# Patient Record
Sex: MALE | Race: BLACK OR AFRICAN AMERICAN | Employment: PART TIME | URBAN - METROPOLITAN AREA
[De-identification: names, ages, dates, MRNs, and addresses within clinical notes are randomized per-mention and may not be internally consistent; named-entity substitution may affect disease eponyms.]

---
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
## 2023-04-06 ENCOUNTER — OFFICE VISIT (OUTPATIENT)
Dept: FAMILY MEDICINE | Facility: CLINIC | Age: 23
End: 2023-04-06
Payer: COMMERCIAL

## 2023-04-06 VITALS
WEIGHT: 156 LBS | BODY MASS INDEX: 21.13 KG/M2 | HEIGHT: 72 IN | RESPIRATION RATE: 16 BRPM | HEART RATE: 105 BPM | TEMPERATURE: 98.3 F | DIASTOLIC BLOOD PRESSURE: 79 MMHG | OXYGEN SATURATION: 97 % | SYSTOLIC BLOOD PRESSURE: 124 MMHG

## 2023-04-06 DIAGNOSIS — H10.13 ALLERGIC CONJUNCTIVITIS, BILATERAL: Primary | ICD-10-CM

## 2023-04-06 PROCEDURE — 99213 OFFICE O/P EST LOW 20 MIN: CPT | Performed by: FAMILY MEDICINE

## 2023-04-06 RX ORDER — OLOPATADINE HYDROCHLORIDE 2 MG/ML
1-2 SOLUTION/ DROPS OPHTHALMIC DAILY
Qty: 2.5 ML | Refills: 1 | Status: SHIPPED | OUTPATIENT
Start: 2023-04-06 | End: 2024-05-31

## 2023-04-06 RX ORDER — LORATADINE 10 MG/1
10 TABLET ORAL DAILY
Qty: 60 TABLET | Refills: 1 | Status: SHIPPED | OUTPATIENT
Start: 2023-04-06 | End: 2024-05-31

## 2023-04-06 ASSESSMENT — VISUAL ACUITY: OU: 1

## 2023-04-06 NOTE — PROGRESS NOTES
Assessment & Plan     Allergic conjunctivitis, bilateral  Symptoms and exam consistent with mild allergic conjunctivitis bilaterally we will treat systemically as patient is also seems to have some mild allergic rhinitis.  And also local drops 1-2 twice a day.  Follow-up if not improved in a week.  - loratadine (CLARITIN) 10 MG tablet; Take 1 tablet (10 mg) by mouth daily  - olopatadine (PATADAY) 0.2 % ophthalmic solution; Place 0.05-0.1 mLs (1-2 drops) into both eyes daily                 Return if symptoms worsen or fail to improve, for CPE/Wellness Visit.    Virgil Coulter MD  [facility] LIANA Fields is a 22 year old, presenting for the following health issues:  Allergies (Pt states that he has itchy, swollen and watery eyes since the end of January)        4/6/2023    11:22 AM   Additional Questions   Roomed by ricardo   Accompanied by self     HPI   Eyes Swollen, itcy and watery no discharge no vision change  Runny nose           Review of Systems         Objective    /79 (BP Location: Left arm, Patient Position: Sitting, Cuff Size: Adult Regular)   Pulse 105   Temp 98.3  F (36.8  C) (Oral)   Resp 16   Ht 1.829 m (6')   Wt 70.8 kg (156 lb)   SpO2 97%   BMI 21.16 kg/m    Body mass index is 21.16 kg/m .  Physical Exam  Constitutional:       Appearance: He is well-developed. He is not ill-appearing or diaphoretic.   HENT:      Head: Normocephalic.      Right Ear: Tympanic membrane and external ear normal.      Left Ear: Tympanic membrane and external ear normal.      Nose: Mucosal edema and rhinorrhea present.      Right Sinus: No maxillary sinus tenderness or frontal sinus tenderness.      Left Sinus: No maxillary sinus tenderness or frontal sinus tenderness.   Eyes:      General: Lids are everted, no foreign bodies appreciated. Vision grossly intact. Allergic shiner present.         Right eye: No foreign body.         Left eye: No foreign body.       Conjunctiva/sclera:      Right eye: Right conjunctiva is injected. No exudate or hemorrhage.     Left eye: Left conjunctiva is injected. No exudate or hemorrhage.     Pupils: Pupils are equal, round, and reactive to light.   Pulmonary:      Effort: Pulmonary effort is normal. No respiratory distress.      Breath sounds: Normal breath sounds. No stridor.   Abdominal:      Palpations: Abdomen is soft.   Musculoskeletal:      Cervical back: Normal range of motion.

## 2023-04-06 NOTE — PATIENT INSTRUCTIONS
Patient Education   Here is the plan from today's visit    1. Allergic conjunctivitis, bilateral    - loratadine (CLARITIN) 10 MG tablet; Take 1 tablet (10 mg) by mouth daily  Dispense: 60 tablet; Refill: 1  - olopatadine (PATADAY) 0.2 % ophthalmic solution; Place 0.05-0.1 mLs (1-2 drops) into both eyes daily  Dispense: 2.5 mL; Refill: 1      Please call or return to clinic if your symptoms don't go away.    Follow up plan  Return if symptoms worsen or fail to improve, for CPE/Wellness Visit.    Thank you for coming to [facility] today.  Lab Testing:  **If you had lab testing today and your results are reassuring or normal they will be mailed to you or sent through [employer] within 7 days.   **If the lab tests need quick action we will call you with the results.  **If you are having labs done on a different day, please call [phone] to schedule at [facility] or [phone] for other [facility] Outpatient Lab locations. Labs do not offer walk-in appointments.  The phone number we will call with results is # [phone] (home) . If this is not the best number please call our clinic and change the number.  Medication Refills:  If you need any refills please call your pharmacy and they will contact us.   If you need to  your refill at a new pharmacy, please contact the new pharmacy directly. The new pharmacy will help you get your medications transferred faster.   Scheduling:  If you have any concerns about today's visit or wish to schedule another appointment please call our office during normal business hours [phone] (8-5:00 M-F). If you can no longer make a scheduled visit, please cancel via [employer] or call us to cancel.   If a referral was made to an [facility] specialty provider and you do not get a call from central scheduling, please refer to directions on your visit summary or call our office during normal business hours for assistance.   If a Mammogram was ordered for you at  the [facility] Center call [phone] to schedule or change your appointment.  If you had an XRay/CT/Ultrasound/MRI ordered the number is [phone] to schedule or change your radiology appointment.   [facility] has limited ultrasound appointments available on Wednesdays, if you would like your ultrasound at [facility], please call [phone] to schedule.   Medical Concerns:  If you have urgent medical concerns please call [phone] at any time of the day.    Virgil Coulter MD

## 2024-05-31 ENCOUNTER — OFFICE VISIT (OUTPATIENT)
Dept: FAMILY MEDICINE | Facility: CLINIC | Age: 24
End: 2024-05-31
Payer: COMMERCIAL

## 2024-05-31 VITALS
TEMPERATURE: 97.9 F | OXYGEN SATURATION: 98 % | WEIGHT: 174.9 LBS | DIASTOLIC BLOOD PRESSURE: 69 MMHG | BODY MASS INDEX: 23.69 KG/M2 | HEIGHT: 72 IN | RESPIRATION RATE: 18 BRPM | HEART RATE: 95 BPM | SYSTOLIC BLOOD PRESSURE: 115 MMHG

## 2024-05-31 DIAGNOSIS — E05.00 GRAVES DISEASE: Primary | ICD-10-CM

## 2024-05-31 DIAGNOSIS — Z11.4 SCREENING FOR HIV (HUMAN IMMUNODEFICIENCY VIRUS): ICD-10-CM

## 2024-05-31 DIAGNOSIS — Z11.59 NEED FOR HEPATITIS C SCREENING TEST: ICD-10-CM

## 2024-05-31 DIAGNOSIS — E34.9 ENDOCRINE EXOPHTHALMOS: ICD-10-CM

## 2024-05-31 DIAGNOSIS — H05.20 ENDOCRINE EXOPHTHALMOS: ICD-10-CM

## 2024-05-31 PROCEDURE — 84480 ASSAY TRIIODOTHYRONINE (T3): CPT

## 2024-05-31 PROCEDURE — 86803 HEPATITIS C AB TEST: CPT

## 2024-05-31 PROCEDURE — 84439 ASSAY OF FREE THYROXINE: CPT

## 2024-05-31 PROCEDURE — 36415 COLL VENOUS BLD VENIPUNCTURE: CPT

## 2024-05-31 PROCEDURE — G2211 COMPLEX E/M VISIT ADD ON: HCPCS

## 2024-05-31 PROCEDURE — 87389 HIV-1 AG W/HIV-1&-2 AB AG IA: CPT

## 2024-05-31 PROCEDURE — 99214 OFFICE O/P EST MOD 30 MIN: CPT | Mod: GC

## 2024-05-31 PROCEDURE — 84443 ASSAY THYROID STIM HORMONE: CPT

## 2024-05-31 RX ORDER — METHIMAZOLE 10 MG/1
10 TABLET ORAL 3 TIMES DAILY
COMMUNITY
Start: 2023-07-11

## 2024-05-31 NOTE — PATIENT INSTRUCTIONS
Patient Education   Here is the plan from today's visit    1. Graves disease  - methimazole (TAPAZOLE) 10 MG tablet; Take 10 mg by mouth 3 times daily  - TSH; Future  - T3 total; Future  - T4 free; Future  - Adult Endocrinology  Referral; Future    2. Endocrine exophthalmos  - Adult Endocrinology  Referral; Future  - Adult Eye  Referral; Future    3. Screening for HIV (human immunodeficiency virus)  - HIV Screening; Future    4. Need for hepatitis C screening test  - Hepatitis C Screen Reflex to HCV RNA Quant and Genotype; Future    Please call or return to clinic if your symptoms don't go away.    Follow up plan  Return in about 3 months (around 8/31/2024).    Thank you for coming to [facility] today.  Lab Testing:  **If you had lab testing today and your results are reassuring or normal they will be mailed to you or sent through [employer] within 7 days.   **If the lab tests need quick action we will call you with the results.  **If you are having labs done on a different day, please call [phone] to schedule at [facility] or [phone] for other [facility] Outpatient Lab locations. Labs do not offer walk-in appointments.  The phone number we will call with results is # [phone] (home) . If this is not the best number please call our clinic and change the number.  Medication Refills:  If you need any refills please call your pharmacy and they will contact us.   If you need to  your refill at a new pharmacy, please contact the new pharmacy directly. The new pharmacy will help you get your medications transferred faster.   Scheduling:  If you have any concerns about today's visit or wish to schedule another appointment please call our office during normal business hours [phone] (8-5:00 M-F). If you can no longer make a scheduled visit, please cancel via [employer] or call us to cancel.   If a referral was made to an [facility] specialty provider and you  do not get a call from central scheduling, please refer to directions on your visit summary or call our office during normal business hours for assistance.   If a Mammogram was ordered for you at the Breast Center call [phone] to schedule or change your appointment.  If you had an XRay/CT/Ultrasound/MRI ordered the number is [phone] to schedule or change your radiology appointment.   [facility] has limited ultrasound appointments available on Wednesdays, if you would like your ultrasound at [facility], please call [phone] to schedule.   Medical Concerns:  If you have urgent medical concerns please call [phone] at any time of the day.    Braulio Ely, DO

## 2024-05-31 NOTE — PROGRESS NOTES
Assessment & Plan     Graves disease  Checking labs today, placing referrals for establishing home for endocrine & ophthalmology providers. Refilled methimazole and emphasized importance of regularly taking medications. Will follow-up in 3 months or sooner if symptoms worsen/problems arise.  - methimazole (TAPAZOLE) 10 MG tablet; Take 10 mg by mouth 3 times daily  - Adult Endocrinology  Referral; Future  - TSH  - T3 total  - T4 free    Endocrine exophthalmos  - Adult Endocrinology  Referral; Future  - Adult Eye  Referral; Future    Screening for HIV (human immunodeficiency virus)  - HIV Screening    Need for hepatitis C screening test  - Hepatitis C Screen Reflex to HCV RNA Quant and Genotype    The longitudinal plan of care for the diagnosis(es)/condition(s) as documented were addressed during this visit. Due to the added complexity in care, I will continue to support Donnie in the subsequent management and with ongoing continuity of care.  Return in about 3 months (around 8/31/2024).    Subjective   Donnie is a 23 year old, presenting for the following health issues:  OTHER (Thyroid check up)        5/31/2024     9:43 AM   Additional Questions   Roomed by Yadira   Accompanied by Self     HPI     Early 2023 noticed some eye bulging, heart rate going fast, palpitations, sweating, weight loss - got a diagnosis of Graves disease. Saw an endocrinologist back in July, got a pill for it (methimazole). Took it consistently for 5 months straight (started playing sports again) but then symptoms returned, started going on-and-off with the med at that point.    Review of Systems  Constitutional, eye, ENT, skin, respiratory, cardiac, and GI are normal except as otherwise noted.    Objective    /69 (BP Location: Left arm, Patient Position: Sitting, Cuff Size: Adult Large)   Pulse 95   Temp 97.9  F (36.6  C) (Oral)   Resp 18   Ht 1.829 m (6')   Wt 79.3 kg (174 lb 14.4 oz)   SpO2 98%   BMI  23.72 kg/m    Physical Exam   Vitals reviewed.  Constitutional: awake, alert, cooperative, in NAD  Eyes: Significant for bilateral exophthalmos  HENT: NCAT without lesions, oral cavity with MMM, intact dentition. No goiter or nodules appreciated on palpation  Respiratory: Lungs CTAB without crackles, wheezing, rales, or increased work of breathing  Cardiovascular: RRR without murmurs or extra sounds, borderline tachycardic  Abdomen: Soft, non-distended, non-tender, positive bowel sounds  Skin: Warm & dry, without lesions, erythema, rashes, or ecchymosis  Musculoskeletal: No extremity redness, swelling, or bony tenderness  Neurologic: A&Ox4, without focal deficit, cranial nerves II-XII grossly intact  Psychiatric: Alert & calm with appropriate affect, mood, insight, and thought processes     Signed Electronically by: Braulio Ely DO

## 2024-05-31 NOTE — LETTER
June 7, 2024      Donnie Silva  [address]  [address]  [place] [address]        Dear ,    We are writing to inform you of your test results.    I don't have any changes to my plan based on these results. Please call us and let us know if you are taking Methimazole three times daily. It is important that you schedule an appointment with Endocrinology. We can help and schedule an appointment for you. Please call [phone].     Resulted Orders   HIV Screening   Result Value Ref Range    HIV Antigen Antibody Combo Nonreactive Nonreactive      Comment:      Negative HIV-1 p24 antigen and HIV-1/2 antibody screening test results usually indicate the absence of HIV-1 and HIV-2 infection. However, such negative results do not rule-out acute HIV infection.  If acute HIV-1 or HIV-2 infection is suspected, detection of HIV-1 or HIV-2 RNA  is recommended.    Hepatitis C Screen Reflex to HCV RNA Quant and Genotype   Result Value Ref Range    Hepatitis C Antibody Nonreactive Nonreactive      Comment:      A nonreactive screening test result does not exclude the possibility of exposure to or infection with HCV. Nonreactive screening test results in individuals with prior exposure to HCV may be due to antibody levels below the limit of detection of this assay or lack of reactivity to the HCV antigens used in this assay. Patients with recent HCV infections (<3 months from time of exposure) may have false-negative HCV antibody results due to the time needed for seroconversion (average of 8 to 9 weeks).   TSH   Result Value Ref Range    TSH <0.01 (L) 0.30 - 4.20 uIU/mL   T3 total   Result Value Ref Range    T3 Total 361 (H) 85 - 202 ng/dL   T4 free   Result Value Ref Range    Free T4 4.14 (H) 0.90 - 1.70 ng/dL       If you have any questions or concerns, please call the clinic at the number listed above.       Sincerely,      Geno Yanes, DO

## 2024-06-01 LAB
HCV AB SERPL QL IA: NONREACTIVE
HIV 1+2 AB+HIV1 P24 AG SERPL QL IA: NONREACTIVE
T3 SERPL-MCNC: 361 NG/DL (ref 85–202)
T4 FREE SERPL-MCNC: 4.14 NG/DL (ref 0.9–1.7)
TSH SERPL DL<=0.005 MIU/L-ACNC: <0.01 UIU/ML (ref 0.3–4.2)

## 2024-06-05 ENCOUNTER — TELEPHONE (OUTPATIENT)
Dept: FAMILY MEDICINE | Facility: CLINIC | Age: 24
End: 2024-06-05
Payer: COMMERCIAL

## 2024-06-05 NOTE — TELEPHONE ENCOUNTER
----- Message from Braulio Ely, DO sent at 6/4/2024  3:06 PM CDT -----  Hi team, could you please give Donnie a call about his test results?    Donnie already knows he has hyperthyroidism & a condition called Grave's Disease, which is reflected by these abnormal results.    I don't have any changes to my plan based on these results, but could you please confirm with him that he IS taking his Methimazole three times daily AND that he heard back to schedule w/Endocrinology? If he hasn't yet, please forward this to the [facility] Coordinator [place] & let me know - it's very important that he get an appointment with them ASAP.    Thank you!

## 2024-06-07 NOTE — TELEPHONE ENCOUNTER
Second attempt to reach patient with no answer, lmtcb. Letter mailed per protocol. Patient does not appear to have made appointment with endocrinology yet. Will pass on to CC.  Diamante Carney RN

## 2024-06-10 ENCOUNTER — TELEPHONE (OUTPATIENT)
Dept: ENDOCRINOLOGY | Facility: CLINIC | Age: 24
End: 2024-06-10
Payer: COMMERCIAL

## 2024-06-10 NOTE — TELEPHONE ENCOUNTER
"6/10/24 Care Coordinator reached out to patient to discuss scheduling Endocrinology appointment. Patient would like to schedule, states \"anytime will work\". CC contacted scheduling and scheduled first available appointment. Per , \"we are booked out quite a ways\". \"What we do is schedule that first available, then we send the order back for review\". \"If the patient does medically need to be sooner the clinic will call patient directly to schedule new appointment.    Endocrinology referral    [address]  [address]  [place] [address]  [phone]  12/16/24 @ 1:00pm with Adriana Hanson PA-C (60 minute visit)    Care Coordinator attempted to reach back out to patient with all appointment information, had to leave a message on patient voicemail. CC informed patient that she would also send all information via mail. Patient does have CC direct number.      Kaylee Arzate  Lead Care Coordinator  [facility]  [phone]    "

## 2024-06-10 NOTE — TELEPHONE ENCOUNTER
Left Voicemail (1st Attempt) for the patient to call back and schedule the following:    Appointment type: new endocrine   Provider: Any that see graves   Return date: within 1 week   Specialty phone number: [phone]  Additional appointment(s) needed: NA   Additonal Notes: LVM, No MyC x1   Traci Hoffmann, RN  P [facility]  Please help schedule urgent on-call within 1 week per protocol. If no visits available within protocol timeframe, please notify nursing so we may ask on-call to add on.    Examples:  6/18 [facility] virtual [facility]  6/19 [facility]  6/25 [facility] virtual [facility]  6/26 [facility] virtual [facility]  8/7 [facility] in person mg    * Check to see if there are sooner visits since pts cancel often. If there are no sooner visits and the options above are scheduled or do not work for pt please schedule next avail New MAIKOL/MAIKOL on-call or 2 back-to-back return MAIKOL slots at [facility] or [facility] only. Thank you. *    *IF PT IS SCHEDULED IN ANY OF THESE SLOTS SEND TE SO WE CAN REQUEST A POSSIBLE ADD ON FOR A SOONER VISIT. THANK YOU*    Please note that the above appointment(s) will require manual scheduling as they are marked as MAIKOL and will not appear using auto search. Do not schedule the patient if another patient has already been scheduled in the requested appointment slot.     Roseline Howell on 6/10/2024 at 10:12 AM

## 2024-06-12 NOTE — TELEPHONE ENCOUNTER
Left Voicemail (2nd Attempt) for the patient to call back and schedule the following:    Appointment type: new endocrine   Provider: Any that see graves   Return date: within 1 week   Specialty phone number: [phone]  Additional appointment(s) needed: NA   Additonal Notes: LVM x2, No MyC, letter sent x1   Traci Hoffmann, RN  [facility]  Please help schedule urgent on-call within 1 week per protocol. If no visits available within protocol timeframe, please notify nursing so we may ask on-call to add on.     Examples:  6/19 [facility]  6/25 [facility] virtual [facility]  6/26 [facility] virtual [facility]  7/9 [facility] virtual [facility]  7/10 [facility] virtual [facility]  7/16 [facility] virtual [facility]  7/17 [facility] virtual [facility]    * Check to see if there are sooner visits since pts cancel often. If there are no sooner visits and the options above are scheduled or do not work for pt please schedule next avail New MAIKOL/MAIKOL on-call or 2 back-to-back return MAIKOL slots at [place] or  only. Thank you. *     *IF PT IS SCHEDULED IN ANY OF THESE SLOTS SEND TE SO WE CAN REQUEST A POSSIBLE ADD ON FOR A SOONER VISIT. THANK YOU*     Please note that the above appointment(s) will require manual scheduling as they are marked as MAIKOL and will not appear using auto search. Do not schedule the patient if another patient has already been scheduled in the requested appointment slot.     Roseline Howell on 6/12/2024 at 9:05 AM

## 2024-06-21 NOTE — CONFIDENTIAL NOTE
RECORDS RECEIVED FROM: internal    DATE RECEIVED: 6.25.24   NOTES (FOR ALL VISITS) STATUS DETAILS   OFFICE NOTES from referring provider internal    Geno Yanes DO      MEDICATION LIST internal     IMAGING      XR (Chest) ce 4.22.24   CT (HEAD/NECK/CHEST/ABDOMEN) ce 4.21.24   LABS     DIABETES: HBGA1C, CREATININE, FASTING LIPIDS, MICROALBUMIN URINE, POTASSIUM, TSH, T4    THYROID: TSH, T4, CBC, THYRODLONULIN, TOTAL T3, FREE T4, CALCITONIN, CEA internal /ce T4- 5.31.24  T3- 5.31.24  TSH- 5.31.24  BMP- 4.21.24  Cbc- 4.21.24

## 2024-06-24 ENCOUNTER — APPOINTMENT (OUTPATIENT)
Dept: INTERPRETER SERVICES | Facility: CLINIC | Age: 24
End: 2024-06-24
Payer: COMMERCIAL

## 2024-06-24 ENCOUNTER — TELEPHONE (OUTPATIENT)
Dept: ENDOCRINOLOGY | Facility: CLINIC | Age: 24
End: 2024-06-24
Payer: COMMERCIAL

## 2024-06-24 NOTE — TELEPHONE ENCOUNTER
"Patient confirmed scheduled appointment:  Date: 7/2   Time: 8 am   Visit type: New Endocrine   Provider: Lorene   Location: [facility]  Testing/imaging: NA   Additional notes: Spoke to pt and offered per below message pt declined. Scheduled next avail MAIKOL on call that worked for pt schedule.   Diamante Méndez MD Davis, Katie, RN; P [facility]  Hi--    I\"m on call this week, and appear to be double booked in my 11am slot? I can see one of them Thursday AM at 8:30 instead if you want to offer that (virtually).    Roseline Howell on 6/24/2024 at 3:16 PM    "

## 2024-06-24 NOTE — TELEPHONE ENCOUNTER
DX, Referring NOTES: Grave's Disease    For Cancer Patients: Need the original operative and surgical pathology reports and all imaging reports/images related to the disease (includes all thyroid US, nuclear thyroid and total body scans, PET scans, chest CT reports since prior to the diagnosis ).   APPT DATE: 7/3/2024   NOTES (FOR ALL VISITS) STATUS DETAILS   OFFICE NOTES from referring provider Internal [facility]:  5/31/24 - [facility] OV with Dr. Ely   OFFICE NOTES from other specialist N/A    ED NOTES Care Everywhere [facility]:  4/21/24 - ED OV with Dr. Contreras   OPERATIVE REPORT  (thyroid, pituitary, adrenal, parathyroid)  (All op notes related to diagnoses) N/A    MEDICATION LIST Internal    IMAGING      XR (Chest) Received [facility]:  4/21/24 - XR Chest   CT (HEAD/NECK/CHEST/ABDOMEN) Received [facility]:  4/22/24 - CT Chest   LABS     DIABETES: HBGA1C, CREATININE, FASTING LIPIDS, MICROALBUMIN URINE, POTASSIUM, TSH, T4    THYROID: TSH, T4, CBC, THYRODLONULIN, TOTAL T3, FREE T4, CALCITONIN, CEA Care Everywhere / Internal [facility]:  5/31/24 - TSH, T4, T3  7/29/16 - Glucose  7/29/16 - HBGA1C  7/29/16 - Urine Analysis    [facility]:  4/21/24 - BMP  4/21/24 - CBC     Records Requested  06/24/24    Facility  [facility]   Outcome * 6/25/24 10:45 AM Images received from [facility] and attached to the patient in PACs. - Ligia

## 2024-06-24 NOTE — TELEPHONE ENCOUNTER
"Left Voicemail (1st Attempt) for the patient to call back and schedule the following:    Appointment type: New Endocrine   Provider: [facility]   Return date: re-margret 6/25 to 6/27 at 8:30 am virtual w [facility]  Specialty phone number: [phone]  Additional appointment(s) needed: NA   Additonal Notes: LVM, No MyC x1  Diamante Méndez MD Davis, Katie, RN; P [facility]  Hi--    I\"m on call this week, and appear to be double booked in my 11am slot? I can see one of them Thursday AM at 8:30 instead if you want to offer that (virtually).    Roseline Howell on 6/24/2024 at 1:44 PM    "

## 2024-06-25 ENCOUNTER — PRE VISIT (OUTPATIENT)
Dept: ENDOCRINOLOGY | Facility: CLINIC | Age: 24
End: 2024-06-25

## 2024-07-02 NOTE — PROGRESS NOTES
Endocrine Consult Video visit note-    Attending Assessment/Plan :     Graves' hyperthyroidism with [facility].  He is already prescribed methimazole  but not taking it  Start methimazole 10 mg daily . Stressed compliance.  I have counseled him on rule for ATD (see AVS)  Labs now and monthly     Addendum  10/7/24 TSH 0.04, free T4 1.17, T3 pending, TSI pending;  on MMI 10 mg/day.  Reduce to 5 mg/day .    2.  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 I did not discuss with him due to #1 and # 3    3.  Noncompliance with medication - as above.      Due to the COVID 19 pandemic this visit was a video visit in order to help prevent spread of infection in this patient and the general population. The patient gave verbal consent for the visit today. I have independently reviewed and interpreted labs, imaging as indicated.     Distant Location (provider location):  Off-site  Mode of Communication:  Video Conference via [employer]  Chart review/prep time 1  prepped 7/2  Visit Start time  0930  Visit Stop time 0953   _53_ minutes spent on the date of the encounter doing chart review, history and exam, documentation and further activities as noted above.    Sandra Paulson MD    Chief complaint:  Donnie is a 23 year old male seen in consultation at the request of Dr Geno Yanes for Graves'/eye disease .  I have reviewed Care Everywhere including [facility]  lab reports, imaging reports and provider notes as indicated.      HISTORY OF PRESENT ILLNESS    Donnie recalls that eye symptoms were first noted 2/2023 weeks following vacation in [place] (12/22-1/10/23)  People started noticing the right eye first, later the left eye.   Graves' was first diagnosed and treatment started in 3/ 2023. I do not have data related to this  He is currently prescribed methimazole three times/day, but he admits he is not taking it all the time.  He last took it was  "about 1 week ago.   His HR is lower and he has less sweating. When he takes the methimazole      He has not seen an eye specialist.  He is very concerned about his eye symptoms and appearance.  The eyes used to be red but not more recently  since on the ATD.  The chart shows a diagnosis of \"allergic conjunctivitis, bilateral\" on 4/6/2023.  The eyes feel like they will fall out when he is looking down, laughing; or sleeping on his side.  He sometimes has double vision; The eyes hurt a lot ; He states \"People look at me differently now; I don't recognize myself \"    Endocrine relevant labs are as follows:  7/29/16 HgbA1c 5.0%  5/31/24 TSH < 0.01, free T4 4.14, T3 361 --     Relevant imaging is as follows: (as read by me as it pertains to endocrine relevant organs)  4/2/24 CT PE study ([facility]): \"thyroid is unremarkable\"    REVIEW OF SYSTEMS  Weight gain \"easily\" , \"losing now\"  ; May 180, now 163;   Appetite is high??? 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 No pertinent surgical history.    Medications  Current Outpatient Medications   Medication Sig Dispense Refill    methimazole (TAPAZOLE) 10 MG tablet Take 10 mg by mouth 3 times daily       Allergies  No Known Allergies    Family History  Family History   Problem Relation Age of Onset    Diabetes No family hx of     Coronary Artery Disease No family hx of     " Hypertension No family hx of     Hyperlipidemia No family hx of     Other Cancer No family hx of     Thyroid Disease No family hx of      Social History  Social History     Tobacco Use    Smoking status: Never     Passive exposure: Never    Smokeless tobacco: Never   Vaping Use    Vaping status: Never Used   Substance Use Topics    Alcohol use: No    Drug use: No     Works 7 days/week; constantly keep forgetting to eat  Nonsmoker  No environmental air toxin exposures.      Physical Exam  There is no height or weight on file to calculate BMI.  BP Readings from Last 1 Encounters:   05/31/24 115/69      Pulse Readings from Last 1 Encounters:   05/31/24 95      Resp Readings from Last 1 Encounters:   05/31/24 18      Temp Readings from Last 1 Encounters:   05/31/24 97.9  F (36.6  C) (Oral)      SpO2 Readings from Last 1 Encounters:   05/31/24 98%      Wt Readings from Last 1 Encounters:   05/31/24 79.3 kg (174 lb 14.4 oz)      Ht Readings from Last 1 Encounters:   05/31/24 1.829 m (6')     GENERAL: pleasant young man in no distress  SKIN: Visible skin clear. No significant rash, abnormal pigmentation or lesions.  EYES: Eyes grossly abnormal to inspection.  Prominent Gross proptosis;  Right eye slightly worse than left eye noted with certain eye movements. No eye redness; no periorbital edema;   NECK: possible subtle visible goiter  RESP: No audible wheeze, cough, or increased work of breathing.    NEURO: Awake, alert, responds appropriately to questions.  Mentation and speech fluent. + subtle tremor of outstretched hand  PSYCH:affect normal and appearance well-groomed.      DATA REVIEW       Latest Ref Rng 5/31/2024  10:13 AM   ENDO THYROID LABS-[facility]     TSH 0.30 - 4.20 uIU/mL <0.01 (L)    Triiodothyronine (T3) 85 - 202 ng/dL 361 (H)    FREE T4 0.90 - 1.70 ng/dL 4.14 (H)       Legend:  (L) Low  (H) High

## 2024-07-02 NOTE — PATIENT INSTRUCTIONS
Labs now and monthly  I will send results on mychart  See me every 4-12  months while on methimazole   Referral to thyroid eye clinic ([phone] to schedule)       Information for patients on Tapazole or Propylthiouracil (PTU)  The generic name for Tapazole is methimazole.    The drugs, Tapazole and PTU are used to treat an overactive thyroid (hyperthyroidism).      They prevent the thyroid from making too much thyroid hormone.  You can think of them as putting up a road block in your thyroid.    These drugs are usually well tolerated and safe, but rarely can cause serious side effects.  The two worst potential side-effects are:  agranulocytosis.  This is the loss of the white blood cells that fight infection.  This can occur in approximately 1 in 200 people.  liver problems.  This is even more rare than agranulocytosis but it can be very serious.    Because of the serious nature of the rare side-effects, you should notify your doctor if you develop the following symptoms while taking the drug:  Fever  sore throat  flu-like symptoms (nausea, vomiting, diarrhea, aches, abdominal pain)    In addition, anytime you have evidence of infection, you should get a blood test to be sure that you do not have agranulocytosis.  A prescription will be provided to you to get this blood test as needed.  This is an extra precaution and the results should be available the same day the blood test is drawn.  If your blood count is OK (which it almost always is), then you may continue to take the antithyroid drug.    While taking this medication, your doctor will probably want to see you frequently, every 1-2 months, at least for a time.  This is important so that the response can be monitored and the dose can be adjusted.      If you have concerns you may reach us at [phone] during regular hours, and [phone] (ask for endocrinologist on call) after hours.    Options for Treatment of Hyperthyroidism in Graves   hyperthyroidism    There are 3 options for treating hyperthyroidism.  The treatment method that is best for you depends on several factors, including your age, general health status, pregnancy status, convenience and preferences.      The options for treatment are listed below with advantages and disadvantages of each:    Medicine.      This requires taking a pill one to 3 times / day.  You will require monthly follow-up with me during the time you are taking the pills.  The pills will control the ability of the thyroid to make too much thyroid hormone and you will gradually improve.      Advantages:  This is an easy and effective form of therapy.    Disadvantages:  This only controls the thyroid while you take the pills. With discontinuation of the pills, the hyperthyroidism will relapse probably within days.    Side-effects are very rare but can be serious, including agranulocytosis (or the loss of white blood cells that control infection).      The pills do not come in an intravenous form, which can make treatment very difficult if you are sick and unable to take oral medication.  The medication is not a life long option, but an acceptable short term treatment.      Ideally antithyroid medication is avoided in pregnancy.      Radioactive iodine    Since the thyroid uses iodine to make thyroid hormone, administration of appropriate doses of radioactive iodine will specifically target and destroy the thyroid, thereby treating the over-activity.     Procedure:  Before the treatment, it is necessary that we determine the function of your thyroid gland by performing a thyroid uptake test in the radiology department at the hospital.  This is a 2-day procedure.  Day 1 involves oral administration of a very small dose of radioactive iodine.  On day 2 (24 hours after the dose was administered), you will lay under the camera, which will take a picture of your thyroid.  This will give us information about your thyroid s  function, which we need in order to calculate your treatment dose of radioactive iodine.     The treatment dose of radioactive iodine is delivered either later on the same day (day 2 of the uptake test), or at your earliest convenience.  The treatment is again an oral administration of radioactive iodine, but the dose is much higher than that used for the uptake test.  You will then gradually return to normal thyroid function over a period of weeks to months.      Advantages:  This is an easy and effective form of therapy.  Painless.      Disadvantages:  In many cases this treatment results in permanent hypothyroidism (thyroid under-activity) which will require thyroid hormone replacement pills for the rest of your life).      Women of reproductive age must be documented to not be pregnant before the treatment.  We also recommend that you not attempt pregnancy for 6 months after the treatment.    This treatment may increase the thyroid stimulating antibody levels and worsen the eye disease of Graves .     Surgical removal of the thyroid gland.      Surgical removal of the thyroid gland will quickly result in hypothyroidism (requirement for thyroid hormone replacement).  For your safety, it is important that your thyroid function is normal prior to the operation (controlled by the anti-thyroid medication) and also that you have an experienced thyroid surgeon perform the operation.    Advantages:  Effective for treatment of hyperthyroidism.      Disadvantages: Risk of damage to the recurrent laryngeal nerve (which can lead to hoarseness); risk of damage to the parathyroid glands (which can lead to low calcium).  Anesthesia risks.  General surgical risks of bleeding, infection.

## 2024-07-03 ENCOUNTER — VIRTUAL VISIT (OUTPATIENT)
Dept: ENDOCRINOLOGY | Facility: CLINIC | Age: 24
End: 2024-07-03
Attending: FAMILY MEDICINE
Payer: COMMERCIAL

## 2024-07-03 ENCOUNTER — PRE VISIT (OUTPATIENT)
Dept: ENDOCRINOLOGY | Facility: CLINIC | Age: 24
End: 2024-07-03

## 2024-07-03 DIAGNOSIS — H05.20 ENDOCRINE EXOPHTHALMOS: ICD-10-CM

## 2024-07-03 DIAGNOSIS — H57.89 THYROID EYE DISEASE: ICD-10-CM

## 2024-07-03 DIAGNOSIS — E07.9 THYROID EYE DISEASE: ICD-10-CM

## 2024-07-03 DIAGNOSIS — Z91.148 NONCOMPLIANCE W/MEDICATION TREATMENT DUE TO INTERMIT USE OF MEDICATION: ICD-10-CM

## 2024-07-03 DIAGNOSIS — E34.9 ENDOCRINE EXOPHTHALMOS: ICD-10-CM

## 2024-07-03 DIAGNOSIS — E05.00 GRAVES DISEASE: Primary | ICD-10-CM

## 2024-07-03 PROCEDURE — 99245 OFF/OP CONSLTJ NEW/EST HI 55: CPT | Mod: 95

## 2024-07-03 RX ORDER — METHIMAZOLE 10 MG/1
10 TABLET ORAL DAILY
Qty: 30 TABLET | Refills: 3 | Status: SHIPPED | OUTPATIENT
Start: 2024-07-03 | End: 2024-10-06

## 2024-07-03 NOTE — LETTER
7/3/2024       RE: Donnie Silva  [address] [facility] [address]     Dear Colleague,    Thank you for referring your patient, Donnie Silva, to the [facility] ENDOCRINOLOGY [facility]INIC [place] at [facility]. Please see a copy of my visit note below.    Endocrine Consult Video visit note-    Attending Assessment/Plan :     Graves' hyperthyroidism with POLINA.  He is already prescribed methimazole  but not taking it  Start methimazole 10 mg daily . Stressed compliance.  I have counseled him on rule for ATD (see AVS)  Labs now and monthly     2.  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 I did not discuss with him due to #1 and # 3    3.  Noncompliance with medication - as above.      Due to the COVID 19 pandemic this visit was a video visit in order to help prevent spread of infection in this patient and the general population. The patient gave verbal consent for the visit today. I have independently reviewed and interpreted labs, imaging as indicated.     Distant Location (provider location):  Off-site  Mode of Communication:  Video Conference via [facility]  Chart review/prep time 1  prepped 7/2  Visit Start time  0930  Visit Stop time 0953   _53_ minutes spent on the date of the encounter doing chart review, history and exam, documentation and further activities as noted above.    Sandra Paulson MD    Chief complaint:  Donnie is a 23 year old male seen in consultation at the request of Dr Geno Yanes for Graves'/eye disease .  I have reviewed Care Everywhere including [facility]  lab reports, imaging reports and provider notes as indicated.      HISTORY OF PRESENT ILLNESS    Donnie recalls that eye symptoms were first noted 2/2023 weeks following vacation in [place] (12/22-1/10/23)  People started noticing the right eye first, later the left eye.   Graves' was first  "diagnosed and treatment started in 3/ 2023. I do not have data related to this  He is currently prescribed methimazole three times/day, but he admits he is not taking it all the time.  He last took it was about 1 week ago.   His HR is lower and he has less sweating. When he takes the methimazole      He has not seen an eye specialist.  He is very concerned about his eye symptoms and appearance.  The eyes used to be red but not more recently  since on the ATD.  The chart shows a diagnosis of \"allergic conjunctivitis, bilateral\" on 4/6/2023.  The eyes feel like they will fall out when he is looking down, laughing; or sleeping on his side.  He sometimes has double vision; The eyes hurt a lot ; He states \"People look at me differently now; I don't recognize myself \"    Endocrine relevant labs are as follows:  7/29/16 HgbA1c 5.0%  5/31/24 TSH < 0.01, free T4 4.14, T3 361 --     Relevant imaging is as follows: (as read by me as it pertains to endocrine relevant organs)  4/2/24 CT PE study ([facility]): \"thyroid is unremarkable\"    REVIEW OF SYSTEMS  Weight gain \"easily\" , \"losing now\"  ; May 180, now 163;   Appetite is high??? 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 No pertinent surgical history.    Medications  Current Outpatient Medications   Medication Sig Dispense Refill    methimazole (TAPAZOLE) 10 MG tablet Take 10 mg by " mouth 3 times daily       Allergies  No Known Allergies    Family History  Family History   Problem Relation Age of Onset    Diabetes No family hx of     Coronary Artery Disease No family hx of     Hypertension No family hx of     Hyperlipidemia No family hx of     Other Cancer No family hx of     Thyroid Disease No family hx of      Social History  Social History     Tobacco Use    Smoking status: Never     Passive exposure: Never    Smokeless tobacco: Never   Vaping Use    Vaping status: Never Used   Substance Use Topics    Alcohol use: No    Drug use: No     Works 7 days/week; constantly keep forgetting to eat  Nonsmoker  No environmental air toxin exposures.      Physical Exam  There is no height or weight on file to calculate BMI.  BP Readings from Last 1 Encounters:   05/31/24 115/69      Pulse Readings from Last 1 Encounters:   05/31/24 95      Resp Readings from Last 1 Encounters:   05/31/24 18      Temp Readings from Last 1 Encounters:   05/31/24 97.9  F (36.6  C) (Oral)      SpO2 Readings from Last 1 Encounters:   05/31/24 98%      Wt Readings from Last 1 Encounters:   05/31/24 79.3 kg (174 lb 14.4 oz)      Ht Readings from Last 1 Encounters:   05/31/24 1.829 m (6')     GENERAL: pleasant young man in no distress  SKIN: Visible skin clear. No significant rash, abnormal pigmentation or lesions.  EYES: Eyes grossly abnormal to inspection.  Prominent Gross proptosis;  Right eye slightly worse than left eye noted with certain eye movements. No eye redness; no periorbital edema;   NECK: possible subtle visible goiter  RESP: No audible wheeze, cough, or increased work of breathing.    NEURO: Awake, alert, responds appropriately to questions.  Mentation and speech fluent. + subtle tremor of outstretched hand  PSYCH:affect normal and appearance well-groomed.      DATA REVIEW       Latest Ref Rn 5/31/2024  10:13 AM   ENDO THYROID LABS-[facility]     TSH 0.30 - 4.20 uIU/mL <0.01 (L)    Triiodothyronine (T3) 85 - 202 ng/dL  361 (H)    FREE T4 0.90 - 1.70 ng/dL 4.14 (H)       Legend:  (L) Low  (H) High      Sandra Paulson MD

## 2024-07-03 NOTE — NURSING NOTE
Current patient location: Patient declined to provide     Is the patient currently in the state of [place]? YES    Visit mode:VIDEO    If the visit is dropped, the patient can be reconnected by: VIDEO VISIT: Text to cell phone:   Telephone Information:   Mobile [phone]       Will anyone else be joining the visit? NO  (If patient encounters technical issues they should call [phone] :[phone])    How would you like to obtain your AVS? MyChart    Are changes needed to the allergy or medication list? No    Are refills needed on medications prescribed by this physician? NO    Reason for visit: RECHECK and Video Visit    Bren FLORES

## 2024-07-05 ENCOUNTER — TELEPHONE (OUTPATIENT)
Dept: OPHTHALMOLOGY | Facility: CLINIC | Age: 24
End: 2024-07-05
Payer: COMMERCIAL

## 2024-07-05 NOTE — TELEPHONE ENCOUNTER
This encounter is being sent to inform the clinic that this patient has a referral from Sandra Paulson MD for the diagnoses of Graves disease, endocrine exophthalmos, thyroid eye disease and has requested that this patient be seen within 1-2 weeks.  Based on the availability of our provider(s), we are unable to accommodate this request.    Were all sites offered this patient?  Yes    Does scheduling algorithm request to schedule next available?  Patient has been scheduled for the first available opening with Dr. Bonds on 9/20/24.  We have informed the patient that the clinic will review their referral and reach out if a sooner appointment is medically necessary.

## 2024-07-08 NOTE — TELEPHONE ENCOUNTER
Called and LVM with interp     I r/s appointment for 07/29 at 1245 with Dr. Bonds at the peds location     Provided a call back number if Shira has any questions     Lashell Doll Communication Facilitator on 7/8/2024 at 1:10 PM

## 2024-07-19 ENCOUNTER — TELEPHONE (OUTPATIENT)
Dept: ENDOCRINOLOGY | Facility: CLINIC | Age: 24
End: 2024-07-19
Payer: COMMERCIAL

## 2024-07-19 NOTE — TELEPHONE ENCOUNTER
Left Message (1st attempt) for the patient to call back and schedule the following:    Appointment type: Return Endocrine  Provider: Dr. Paulson  Return date: 4-12 mo (Nov 2024-July 2025) RTN MAIKOL GUPTA  Specialty phone number: [phone]  Additional appointment(s) needed:   -labs now and again 1 mo after that lab draw  Additonal Notes: Eye appointment scheduled  LVM x1 (no myc)     Check Out Comments: Labs now and in one month Referral to thyroid eye clinic RTC 4-12 months- OK to use return MAIKOL Help him get on mychart

## 2024-07-24 ENCOUNTER — TELEPHONE (OUTPATIENT)
Dept: ENDOCRINOLOGY | Facility: CLINIC | Age: 24
End: 2024-07-24
Payer: COMMERCIAL

## 2024-07-24 NOTE — TELEPHONE ENCOUNTER
Patient Contacted for the patient to call back and schedule the following:    Appointment type: return endocrine   Provider: Lorene   Return date: 4-12 month f/up (see below)   Specialty phone number: [phone]  Additional appointment(s) needed: labs and earliest convenience then, again one month later  Additonal Notes:   Check Out Comments: Labs now and in one month Referral to thyroid eye clinic RTC 4-12 months- OK to use return MAIKOL Help him get on mychar     Available:   1/7 virtual   1/13 virtual   1/21 ^   1/27 ^     Please note that the above appointment(s) will require manual scheduling as they are marked as MAIKOL and will not appear using auto search. Do not schedule the patient if another patient has already been scheduled in the requested appointment slot.   DO NOT PUT PT IN A NEW APPT SPOT     Brandy Rodrigues on 7/24/2024 at 12:05 PM

## 2024-08-30 ENCOUNTER — TELEPHONE (OUTPATIENT)
Dept: ENDOCRINOLOGY | Facility: CLINIC | Age: 24
End: 2024-08-30
Payer: COMMERCIAL

## 2024-08-30 NOTE — TELEPHONE ENCOUNTER
methimazole (TAPAZOLE) 10 MG tablet       Last Written Prescription Date:  7/3/24  Last Fill Quantity: 30,   # refills: 3  [facility]"" - [place], [place] - [address]   Called [facility]   Refills on file-reviewed that patient ready for  and let patient know also.

## 2024-08-30 NOTE — TELEPHONE ENCOUNTER
[facility] Call [facility]    Phone Message    May a detailed message be left on voicemail: yes     Reason for Call: Medication Refill Request    Has the patient contacted the pharmacy for the refill? Yes   Name of medication being requested: methimazole (TAPAZOLE) 10 MG tablet [[id number]] (Order [id number])   Provider who prescribed the medication: jennifer  Pharmacy:  [place], [place] - [address]   Date medication is needed: asap   Action Taken: Message routed to:  [facility] endo    Travel Screening: Not Applicable     Date of Service:

## 2024-09-30 ENCOUNTER — OFFICE VISIT (OUTPATIENT)
Dept: OPHTHALMOLOGY | Facility: CLINIC | Age: 24
End: 2024-09-30
Attending: OPHTHALMOLOGY
Payer: COMMERCIAL

## 2024-09-30 DIAGNOSIS — E07.9 THYROID EYE DISEASE: ICD-10-CM

## 2024-09-30 DIAGNOSIS — H51.9 CONVERGENCE INSUFFICIENCY OR PALSY IN BINOCULAR EYE MOVEMENT: ICD-10-CM

## 2024-09-30 DIAGNOSIS — H52.203 HIGH DEGREE OF ASTIGMATISM IN BOTH EYES: ICD-10-CM

## 2024-09-30 DIAGNOSIS — E05.00 GRAVES DISEASE: Primary | ICD-10-CM

## 2024-09-30 DIAGNOSIS — H57.89 THYROID EYE DISEASE: ICD-10-CM

## 2024-09-30 DIAGNOSIS — H52.13 HIGH MYOPIA, BOTH EYES: ICD-10-CM

## 2024-09-30 PROCEDURE — 92285 EXTERNAL OCULAR PHOTOGRAPHY: CPT | Performed by: OPHTHALMOLOGY

## 2024-09-30 PROCEDURE — 92060 SENSORIMOTOR EXAMINATION: CPT | Performed by: OPHTHALMOLOGY

## 2024-09-30 PROCEDURE — G0463 HOSPITAL OUTPT CLINIC VISIT: HCPCS | Performed by: OPHTHALMOLOGY

## 2024-09-30 PROCEDURE — 99244 OFF/OP CNSLTJ NEW/EST MOD 40: CPT | Performed by: OPHTHALMOLOGY

## 2024-09-30 ASSESSMENT — VISUAL ACUITY
OS_CC: 20/20
OS_CC+: -2
CORRECTION_TYPE: GLASSES
OD_CC+: -2
OD_CC: 20/20
METHOD: SNELLEN - LINEAR

## 2024-09-30 ASSESSMENT — TONOMETRY
OD_IOP_MMHG: 22
IOP_METHOD: ICARE
OS_IOP_MMHG: 22

## 2024-09-30 ASSESSMENT — CUP TO DISC RATIO
OS_RATIO: 0.3
OD_RATIO: 0.3

## 2024-09-30 ASSESSMENT — REFRACTION_WEARINGRX
OS_AXIS: 100
OD_AXIS: 085
OS_CYLINDER: +6.00
OD_CYLINDER: +5.25
OS_SPHERE: -10.00
OD_SPHERE: -8.00

## 2024-09-30 ASSESSMENT — MARGIN REFLEX DISTANCE
OD_MRD1: 5
OD_MRD2: 7
OS_MRD2: 7
OS_MRD1: 5

## 2024-09-30 ASSESSMENT — LAGOPHTHALMOS
OD_LAGOPHTHALMOS: 2
OS_LAGOPHTHALMOS: 2

## 2024-09-30 ASSESSMENT — EXTERNAL EXAM - RIGHT EYE: OD_EXAM: PROPTOSIS

## 2024-09-30 ASSESSMENT — EXTERNAL EXAM - LEFT EYE: OS_EXAM: PROPTOSIS

## 2024-09-30 NOTE — NURSING NOTE
Chief Complaint(s) and History of Present Illness(es)       Thyroid Disease              Laterality: both eyes    Associated symptoms: Negative for eye pain    Comments: Early 2023 noticed some eye bulging, heart rate going fast, palpitations, sweating, weight loss - got a diagnosis of Graves disease. Saw an endocrinologist back in July, got a pill for it (methimazole). Took it consistently for 5 months straight (started playing sports again) but then symptoms returned, started going on-and-off with the med at that point.  Taking methimazole once a day now.  Noticing improvement of the eye bulging. Occasional diplopia and pain. Has glasses, wears them as needed.               Comments    05/31/24 10:13  T4 Free: 4.14 (H)  Triiodothyronine (T3): 361 (H)  TSH: <0.01 (L)

## 2024-09-30 NOTE — LETTER
2024     RE:  :  MRN: Donnie Silva  2000  [id number]     Dear Dr. Paulson    Thank you for asking me to see your very pleasant patient,Donnie Silva, in neuro-ophthalmic consultation.  I would like to thank you for sending your records and I have summarized them in the history of present illness.   My assessment and plan are below.  For further details, please see my attached clinic note.      Referring physician: Dr. Paulson    HPI: Patient first noted bulging of both eyes around 2023. He also experienced onset of tachycardia and excessive sweating around the same time. He was diagnosed with hyperthyroidism and started on methimazole.     Since then, he has noted bilateral pressure-like sensation, fatigue, watery discharge. He denies vision loss and diplopia. The bulging seems like it is getting better since 2024.      Thyroid history: Graves disease  Diagnosed when? 3/2023  BERNAL: None  Thyroidectomy: None    TSI  None    TSH    Recent Labs   Lab Test 24  1013   TSH <0.01*           Eye symptoms (since when):   Proptosis (better/worse/same since last visit): yes, initial visit   Diplopia(better/worse/same since last visit): none, initial visit  Eyelid retraction(better/worse/same since last visit): yes, initial visit  Tearing(better/worse/same since last visit): yes, initial visit  Redness (better/worse/same since last visit): yes, initial visit  Pain (better/worse/same since last visit): none, initial visit  Pain to move the eyes (better/worse/same since last visit): none, initial visit  Blurred vision: none, initial visit    Ocular history:   Orbital decompression: N/A  Strabismus surgery: N/A  Eyelid surgery:   S/p superotemporal periorbital mass removal (at age 8, 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    Patient  reports that he has never smoked. He has never been exposed to tobacco smoke. He has never used smokeless tobacco. He reports that he does not drink alcohol and does not use drugs.    Exam:   René (base):25/25 (base 105)     Better/worse same: N/A  Strabismus (better/worse/same): None  Eyelid retraction (better/worse/same): BUL, BLL    VICKI Score:  1. Spontaneous orbital pain.     0  2. Gaze evoked orbital pain.     0  3. Eyelid swelling due to active thyroid eye disease  1  4. Eyelid erythema.      0  5. Conjunctival redness due to active thyroid eye disease . 1  6. Chemosis.        1  7. Inflammation of caruncle OR plica.   1    Patients assessed after follow-up can be scored out of 10 by  including items 8-10.    8. Increase of > 2mm in proptosis.    N/A   9. Decrease in uniocular excursion in any direction of > 8 . N/A  10. Decrease of acuity equivalent to 1 Snellen line.  N/A   VICKI SCORE = 4/7    Monzon Diplopia Score = 0  0 = no diplopia  1 = intermittent (when tired, upon waking, end of day etc)  2 = inconstant (extreme gazes)  3 = constant    Donnie LEEROY Silva is a 24 year old male with the following diagnoses:   1. Graves disease    2. 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 High myopia, both eyes    5. High degree of astigmatism in both eyes      History of Graves disease (diagnosed 2023)    Likely ative, moderate POLINA    VICKI 4/7    Exam notable for bilateral proptosis, periorbital fullness, lid retraction, and anterior surface inflammation. Sensorimotor exam wnl.     Prominent globes bilaterally in setting of high myopia, will obtain CT orbits and TSI to further assess.  Discussed options for observation vs teprotumumab vs steroids. Patient interested in observation at this time.    Thyroid eye  disease (POLINA).  The natural history of thyroid eye disease was discussed. We told the patient that typically POLINA will worsen for a period of time, then improve to some degree, and then stabilize without normalizing.  This process can take somewhere between 1 and 3 years on average.  In the meantime, we recommended seeing the patient in the [facility] every 6 months (sooner if the patient experiences worsening vision in either eye).  Once the patient becomes stable for at least 6 months' time, we discussed that the patient may need restorative surgery or prisms.  Finally, we discussed that correcting the thyroid hormone levels does not ensure that the eyes will normalize but that it could help to some degree.      The patient was asked to avoid smoking and second hand smoke.  The patient was instructed to take selenium 100 micrograms 2x/day.  The patient was told to use artificial tears as needed for dryness and irritation.  The patient was instructed to call as soon as possible if they experience visual loss or decline in either eye.     Follow up 6 months sooner as needed for worsening symptoms.       Again, thank you for allowing me to participate in the care of your patient.      Sincerely,    Edwin Bonds MD  Professor  Director of Neuro-Ophthalmology  [facility] [facility] Endowed Chair  Departments of Ophthalmology, Neurology, and Neurosurgery  [place] [address]  [address], [place]  [address]  T - [phone]  F - [phone]  [email or web address]      CC: Sandra Paulson MD  [address] [address]  Via In Basket    DX = Thyroid eye disease

## 2024-09-30 NOTE — PROGRESS NOTES
THYROID EYE DISEASE CLINIC - NEW VISIT NOTE         Referring physician: Dr. Paulson    HPI:   Patient first noted bulging of both eyes around 2/2023. He also experienced onset of tachycardia and excessive sweating around the same time. He was diagnosed with hyperthyroidism and started on methimazole.   Since then, he has noted bilateral pressure-like sensation, fatigue, watery discharge. He denies vision loss and diplopia. The bulging seems like it is getting better since August 2024.      Thyroid history: Graves disease  Diagnosed when? 3/2023  BERNAL: None  Thyroidectomy: None    TSI    None    TSH    Recent Labs   Lab Test 05/31/24  1013   TSH <0.01*       Eye symptoms (since when):   Proptosis (better/worse/same since last visit): yes, initial visit   Diplopia(better/worse/same since last visit): none, initial visit  Eyelid retraction(better/worse/same since last visit): yes, initial visit  Tearing(better/worse/same since last visit): yes, initial visit  Redness (better/worse/same since last visit): yes, initial visit  Pain (better/worse/same since last visit): none, initial visit  Pain to move the eyes (better/worse/same since last visit): none, initial visit  Blurred vision: none, initial visit    Ocular history:   Orbital decompression: N/A  Strabismus surgery: N/A  Eyelid surgery:   S/p superotemporal periorbital mass removal (at age 8, 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    Patient  reports that he has never smoked. He has never been exposed to tobacco smoke. He has never used smokeless tobacco. He reports that he does not drink alcohol and does not use drugs.      Exam:   René  (base):25/25 (base 105)     Better/worse same: N/A  Strabismus (better/worse/same): None  Eyelid retraction (better/worse/same): BUL, BLL    VICKI Score:  1. Spontaneous orbital pain.     0  2. Gaze evoked orbital pain.     0  3. Eyelid swelling due to active thyroid eye disease  1  4. Eyelid erythema.      0  5. Conjunctival redness due to active thyroid eye disease . 1  6. Chemosis.        1  7. Inflammation of caruncle OR plica.   1    Patients assessed after follow-up can be scored out of 10 by  including items 8-10.    8. Increase of > 2mm in proptosis.    N/A   9. Decrease in uniocular excursion in any direction of > 8 . N/A  10. Decrease of acuity equivalent to 1 Snellen line.  N/A     VICKI SCORE = 4/7    Monzon Diplopia Score = 0  0 = no diplopia  1 = intermittent (when tired, upon waking, end of day etc)  2 = inconstant (extreme gazes)  3 = constant    Donnie Silva is a 24 year old male with the following diagnoses:   1. Graves disease    2. 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 High myopia, both eyes    5. High degree of astigmatism in both eyes      History of Graves disease (diagnosed 2023)  Likely ative, moderate POLINA  VICKI 4/7  Exam notable for bilateral proptosis, periorbital fullness, lid retraction, and anterior surface inflammation. Sensorimotor exam wnl.     Prominent globes bilaterally in setting of high myopia, will obtain CT orbits and TSI to further assess.  Discussed options for observation vs teprotumumab vs steroids. Patient interested in observation at this time.    Thyroid eye disease (POLINA).  The natural history of thyroid eye disease was discussed. We told the patient that typically POLINA will worsen for a period of time, then improve to some degree, and then stabilize without normalizing.  This process can take somewhere between 1 and 3 years on average.  In the meantime, we recommended seeing the patient in the [facility] every 6 months (sooner if the patient experiences worsening vision in either eye).  Once the patient becomes stable for at least 6 months' time, we discussed that the patient may need restorative surgery or prisms.  Finally, we discussed that correcting the thyroid hormone levels does not ensure that the eyes will normalize but that it could help to some degree.      The patient was asked to avoid smoking and second hand smoke.  The patient was instructed to take selenium 100 micrograms 2x/day.  The patient was told to use artificial tears as needed for dryness and irritation.  The patient was instructed to call as soon as possible if they experience visual loss or decline in either eye.     Follow up 6 months sooner as needed for worsening symptoms.           Attending Physician Attestation:  Complete documentation of historical and exam elements from today's encounter can be found in the full encounter summary report (not reduplicated in this progress note).  I personally obtained the chief complaint(s) and history of present illness.  I confirmed and edited as necessary the review of systems, past medical/surgical history, family history, social history, and examination findings as documented by others; and I examined the patient myself.  I personally reviewed the relevant tests, images, and reports as documented above.  I formulated and edited as necessary the assessment and plan and discussed the findings and management plan with the patient and family. I personally reviewed the ophthalmic test(s) associated with this encounter, agree with the interpretation(s) as documented by the resident/fellow, and have edited the corresponding report(s) as necessary.  - Edwin Bonds MD       Precharting:   Jude Lagunas MD   Fellow, Neuro-ophthalmology

## 2024-10-06 ENCOUNTER — MYC REFILL (OUTPATIENT)
Dept: ENDOCRINOLOGY | Facility: CLINIC | Age: 24
End: 2024-10-06
Payer: COMMERCIAL

## 2024-10-06 DIAGNOSIS — E05.00 GRAVES DISEASE: ICD-10-CM

## 2024-10-06 DIAGNOSIS — H05.20 ENDOCRINE EXOPHTHALMOS: ICD-10-CM

## 2024-10-06 DIAGNOSIS — E34.9 ENDOCRINE EXOPHTHALMOS: ICD-10-CM

## 2024-10-07 ENCOUNTER — HOSPITAL ENCOUNTER (OUTPATIENT)
Dept: CT IMAGING | Facility: CLINIC | Age: 24
Discharge: HOME | End: 2024-10-07
Attending: OPHTHALMOLOGY
Payer: COMMERCIAL

## 2024-10-07 ENCOUNTER — APPOINTMENT (OUTPATIENT)
Dept: LAB | Facility: CLINIC | Age: 24
End: 2024-10-07
Attending: OPHTHALMOLOGY
Payer: COMMERCIAL

## 2024-10-07 ENCOUNTER — TELEPHONE (OUTPATIENT)
Dept: ENDOCRINOLOGY | Facility: CLINIC | Age: 24
End: 2024-10-07
Payer: COMMERCIAL

## 2024-10-07 DIAGNOSIS — E05.00 GRAVES DISEASE: ICD-10-CM

## 2024-10-07 DIAGNOSIS — H05.20 ENDOCRINE EXOPHTHALMOS: ICD-10-CM

## 2024-10-07 DIAGNOSIS — E34.9 ENDOCRINE EXOPHTHALMOS: ICD-10-CM

## 2024-10-07 DIAGNOSIS — H51.9 CONVERGENCE INSUFFICIENCY OR PALSY IN BINOCULAR EYE MOVEMENT: ICD-10-CM

## 2024-10-07 DIAGNOSIS — E07.9 THYROID EYE DISEASE: ICD-10-CM

## 2024-10-07 DIAGNOSIS — H52.203 HIGH DEGREE OF ASTIGMATISM IN BOTH EYES: ICD-10-CM

## 2024-10-07 DIAGNOSIS — H52.13 HIGH MYOPIA, BOTH EYES: ICD-10-CM

## 2024-10-07 DIAGNOSIS — H57.89 THYROID EYE DISEASE: ICD-10-CM

## 2024-10-07 PROCEDURE — 70480 CT ORBIT/EAR/FOSSA W/O DYE: CPT | Mod: 26 | Performed by: RADIOLOGY

## 2024-10-07 PROCEDURE — 70480 CT ORBIT/EAR/FOSSA W/O DYE: CPT

## 2024-10-07 PROCEDURE — 84480 ASSAY TRIIODOTHYRONINE (T3): CPT

## 2024-10-07 RX ORDER — METHIMAZOLE 10 MG/1
5 TABLET ORAL DAILY
Qty: 30 TABLET | Refills: 0 | Status: SHIPPED | OUTPATIENT
Start: 2024-10-07 | End: 2024-11-07

## 2024-10-07 RX ORDER — METHIMAZOLE 10 MG/1
10 TABLET ORAL DAILY
Qty: 30 TABLET | Refills: 0 | Status: SHIPPED | OUTPATIENT
Start: 2024-10-07 | End: 2024-10-07

## 2024-10-07 NOTE — TELEPHONE ENCOUNTER
Patient confirmed scheduled appointment:  Date: 10/08/25  Time: 1:40  Visit type: return endo  Provider: Lorene  Location: virtual  Testing/imaging: labs 10/07 and 11/07  Additional notes: Spoke with patient and scheduled per Ayesha RN's instructions      Traci Hoffmann RN   to [id number]  Lorene, MD SARTHAK Ni      10/7/24  7:02 AM  Dr Paulson: Rx refill request    CCs: please help schedule lab draw asap and then monthly and follow up with Sandra around NOV/DEC.    Thank you.    Clair Henning on 10/7/2024 at 10:13 AM

## 2024-10-10 ENCOUNTER — MYC MEDICAL ADVICE (OUTPATIENT)
Dept: ENDOCRINOLOGY | Facility: CLINIC | Age: 24
End: 2024-10-10
Payer: COMMERCIAL

## 2024-10-10 NOTE — TELEPHONE ENCOUNTER
Attempted to reach pt via phone. No answer. Voicemail full. Unable to leave message.   Message sent via ethology and [employer].   LAB draw NOV 07 2024 in place.   Traci Hoffmann RN on 10/10/2024 at 12:00 PM

## 2024-11-07 ENCOUNTER — MYC REFILL (OUTPATIENT)
Dept: ENDOCRINOLOGY | Facility: CLINIC | Age: 24
End: 2024-11-07
Payer: COMMERCIAL

## 2024-11-07 ENCOUNTER — LAB (OUTPATIENT)
Dept: LAB | Facility: CLINIC | Age: 24
End: 2024-11-07
Payer: COMMERCIAL

## 2024-11-07 DIAGNOSIS — E05.00 GRAVES DISEASE: ICD-10-CM

## 2024-11-07 DIAGNOSIS — H05.20 ENDOCRINE EXOPHTHALMOS: ICD-10-CM

## 2024-11-07 DIAGNOSIS — E34.9 ENDOCRINE EXOPHTHALMOS: ICD-10-CM

## 2024-11-07 LAB
T3 SERPL-MCNC: 77 NG/DL (ref 85–202)
T4 FREE SERPL-MCNC: 0.86 NG/DL (ref 0.9–1.7)
TSH SERPL DL<=0.005 MIU/L-ACNC: 1.14 UIU/ML (ref 0.3–4.2)

## 2024-11-07 PROCEDURE — 84480 ASSAY TRIIODOTHYRONINE (T3): CPT

## 2024-11-07 PROCEDURE — 84443 ASSAY THYROID STIM HORMONE: CPT

## 2024-11-07 PROCEDURE — 36415 COLL VENOUS BLD VENIPUNCTURE: CPT

## 2024-11-07 PROCEDURE — 84439 ASSAY OF FREE THYROXINE: CPT

## 2024-11-07 RX ORDER — METHIMAZOLE 10 MG/1
5 TABLET ORAL DAILY
Qty: 30 TABLET | Refills: 1 | Status: SHIPPED | OUTPATIENT
Start: 2024-11-07

## 2024-11-19 ENCOUNTER — TELEPHONE (OUTPATIENT)
Dept: ENDOCRINOLOGY | Facility: CLINIC | Age: 24
End: 2024-11-19
Payer: COMMERCIAL

## 2024-11-19 NOTE — TELEPHONE ENCOUNTER
Left Voicemail (1st Attempt) for the patient to call back and schedule the following:    Appointment type: Labs   Provider: Lorene   Return date: on or near 12/8   Specialty phone number: [phone]  Additional appointment(s) needed:   Additonal Notes: LVM, MyC x1   Per pt:  Hey Docter, I was wondering if you schedule me the lab appointment for next month. The monthly check in for the blood test     Per Ayesha RN:  Please help schedule lab draw     Roseline Howell on 11/19/2024 at 8:54 AM

## 2024-11-21 NOTE — TELEPHONE ENCOUNTER
Patient confirmed scheduled appointment:  Date: 12/09  Time: 3:00  Visit type: lab  Provider: Lorene  Location: [facility]  Testing/imaging:   Additional notes:        Traci Hoffmann, RN  [facility]inic [id number] days ago     DM  Please help schedule lab draw     Clair Henning on 11/21/2024 at 2:40 PM

## 2024-11-24 ENCOUNTER — HEALTH MAINTENANCE LETTER (OUTPATIENT)
Age: 24
End: 2024-11-24

## 2025-01-14 ENCOUNTER — TELEPHONE (OUTPATIENT)
Dept: ENDOCRINOLOGY | Facility: CLINIC | Age: 25
End: 2025-01-14
Payer: COMMERCIAL

## 2025-01-14 DIAGNOSIS — E05.00 GRAVES DISEASE: Primary | ICD-10-CM

## 2025-01-14 RX ORDER — METHIMAZOLE 5 MG/1
5 TABLET ORAL DAILY
Qty: 30 TABLET | Refills: 4 | Status: SHIPPED | OUTPATIENT
Start: 2025-01-14

## 2025-01-14 NOTE — TELEPHONE ENCOUNTER
Patient confirmed scheduled appointment:  Date: 1/20   Time: 2 pm   Visit type: Lab   Provider: Lorene   Location: [place]   Testing/imaging:   Additional notes: Spoke to pt and margret per below message.   Per Ayesha RN:  CCs: lab draw due. Please help schedule. Thank you.     Roseline Howell on 1/14/2025 at 1:05 PM

## 2025-01-15 ENCOUNTER — MYC REFILL (OUTPATIENT)
Dept: ENDOCRINOLOGY | Facility: CLINIC | Age: 25
End: 2025-01-15
Payer: COMMERCIAL

## 2025-01-15 DIAGNOSIS — E05.00 GRAVES DISEASE: ICD-10-CM

## 2025-01-15 RX ORDER — METHIMAZOLE 5 MG/1
5 TABLET ORAL DAILY
Qty: 30 TABLET | Refills: 4 | Status: CANCELLED | OUTPATIENT
Start: 2025-01-15

## 2025-02-20 ENCOUNTER — MYC REFILL (OUTPATIENT)
Dept: ENDOCRINOLOGY | Facility: CLINIC | Age: 25
End: 2025-02-20
Payer: COMMERCIAL

## 2025-02-20 DIAGNOSIS — E05.00 GRAVES DISEASE: ICD-10-CM

## 2025-02-20 RX ORDER — METHIMAZOLE 5 MG/1
5 TABLET ORAL DAILY
Qty: 30 TABLET | Refills: 4 | Status: SHIPPED | OUTPATIENT
Start: 2025-02-20

## 2025-02-21 ENCOUNTER — LAB (OUTPATIENT)
Dept: LAB | Facility: CLINIC | Age: 25
End: 2025-02-21
Payer: COMMERCIAL

## 2025-02-21 DIAGNOSIS — E05.00 GRAVES DISEASE: ICD-10-CM

## 2025-02-21 LAB
T3 SERPL-MCNC: 182 NG/DL (ref 85–202)
T4 FREE SERPL-MCNC: 2.2 NG/DL (ref 0.9–1.7)
TSH SERPL DL<=0.005 MIU/L-ACNC: 0.01 UIU/ML (ref 0.3–4.2)

## 2025-02-21 PROCEDURE — 36415 COLL VENOUS BLD VENIPUNCTURE: CPT

## 2025-02-21 PROCEDURE — 84480 ASSAY TRIIODOTHYRONINE (T3): CPT

## 2025-02-21 PROCEDURE — 84439 ASSAY OF FREE THYROXINE: CPT

## 2025-02-21 PROCEDURE — 84443 ASSAY THYROID STIM HORMONE: CPT

## 2025-03-04 ENCOUNTER — TELEPHONE (OUTPATIENT)
Dept: ENDOCRINOLOGY | Facility: CLINIC | Age: 25
End: 2025-03-04
Payer: COMMERCIAL

## 2025-03-04 NOTE — TELEPHONE ENCOUNTER
He only missed taking the Methimazole 5 mg that day of labs . He had been on 10 mg but was told to decrease to 5 mg daily . He does say he feels like his eyes are getting bigger on the 5 mg dose.  Eliza Zhou RN on 3/4/2025 at 3:58 PM

## 2025-03-04 NOTE — TELEPHONE ENCOUNTER
" call:  Please call Donnie and read him the lab note I sent him after the last labs on 2/21/25 which I can see he hasn't read yet  \"Please update me on the circumstances of these abnormally high thyroid blood levels.  Are you taking methimazole? What dose?  Did you run out of medication?  How often are you missing it? \"  Thanks  Sandra Paulson MD  "

## 2025-03-05 DIAGNOSIS — E05.00 GRAVES DISEASE: ICD-10-CM

## 2025-03-05 RX ORDER — METHIMAZOLE 5 MG/1
7.5 TABLET ORAL DAILY
Qty: 45 TABLET | Refills: 4 | Status: SHIPPED | OUTPATIENT
Start: 2025-03-05

## 2025-03-05 NOTE — TELEPHONE ENCOUNTER
OK.  Please tell him to increase methimazole from 5 mg/day to 7.5 mg/day. Explain to him that he will have to break the 5 mg in half and take 1.5 /day.  Repeat labs one month.  Thanks  Sandra Paulson MD

## 2025-03-05 NOTE — TELEPHONE ENCOUNTER
Spoke with patient  and he gave verbal understanding to 7.5 mg per day and labs in One month. He will split the 5 mg  tablet. Eliza Zhou RN on 3/5/2025 at 1:04 PM

## 2025-03-31 ENCOUNTER — OFFICE VISIT (OUTPATIENT)
Dept: OPHTHALMOLOGY | Facility: CLINIC | Age: 25
End: 2025-03-31
Attending: OPHTHALMOLOGY
Payer: COMMERCIAL

## 2025-03-31 DIAGNOSIS — E07.9 THYROID EYE DISEASE: Primary | ICD-10-CM

## 2025-03-31 DIAGNOSIS — H57.89 THYROID EYE DISEASE: Primary | ICD-10-CM

## 2025-03-31 DIAGNOSIS — H50.34 EXOTROPIA, ALTERNATING, INTERMITTENT: ICD-10-CM

## 2025-03-31 PROCEDURE — 92015 DETERMINE REFRACTIVE STATE: CPT

## 2025-03-31 PROCEDURE — G0463 HOSPITAL OUTPT CLINIC VISIT: HCPCS | Performed by: OPHTHALMOLOGY

## 2025-03-31 PROCEDURE — 92060 SENSORIMOTOR EXAMINATION: CPT | Performed by: OPHTHALMOLOGY

## 2025-03-31 ASSESSMENT — REFRACTION_WEARINGRX
OS_AXIS: 100
OS_CYLINDER: +6.00
OS_SPHERE: -10.00
OD_AXIS: 085
OD_SPHERE: -8.00
OD_CYLINDER: +5.25

## 2025-03-31 ASSESSMENT — CONF VISUAL FIELD
OS_NORMAL: 1
OS_SUPERIOR_TEMPORAL_RESTRICTION: 0
OS_INFERIOR_NASAL_RESTRICTION: 0
METHOD: TOYS
OD_INFERIOR_NASAL_RESTRICTION: 0
OD_SUPERIOR_TEMPORAL_RESTRICTION: 0
OD_SUPERIOR_NASAL_RESTRICTION: 0
OS_SUPERIOR_NASAL_RESTRICTION: 0
OS_INFERIOR_TEMPORAL_RESTRICTION: 0
OD_NORMAL: 1
OD_INFERIOR_TEMPORAL_RESTRICTION: 0

## 2025-03-31 ASSESSMENT — LAGOPHTHALMOS
OS_LAGOPHTHALMOS: 2
OD_LAGOPHTHALMOS: 2

## 2025-03-31 ASSESSMENT — TONOMETRY
OD_IOP_MMHG: 18
IOP_METHOD: ICARE
OS_IOP_MMHG: 16

## 2025-03-31 ASSESSMENT — REFRACTION_MANIFEST
OS_CYLINDER: +6.00
OS_AXIS: 105
OS_SPHERE: -11.00
OD_CYLINDER: +5.50
OD_SPHERE: -7.75
OS_ADD: 20/20-3
OD_ADD: 20/20-2
OD_AXIS: 085

## 2025-03-31 ASSESSMENT — VISUAL ACUITY
CORRECTION_TYPE: GLASSES
OS_CC: 20/40
OD_CC: 20/25
OS_CC+: +2
OD_CC+: +1
METHOD: SNELLEN - LINEAR

## 2025-03-31 ASSESSMENT — EXTERNAL EXAM - RIGHT EYE: OD_EXAM: PROPTOSIS

## 2025-03-31 ASSESSMENT — MARGIN REFLEX DISTANCE
OD_MRD1: 5
OD_MRD2: 7
OS_MRD2: 7
OS_MRD1: 5

## 2025-03-31 ASSESSMENT — EXTERNAL EXAM - LEFT EYE: OS_EXAM: PROPTOSIS

## 2025-04-21 ENCOUNTER — MYC REFILL (OUTPATIENT)
Dept: ENDOCRINOLOGY | Facility: CLINIC | Age: 25
End: 2025-04-21
Payer: COMMERCIAL

## 2025-04-21 DIAGNOSIS — E05.00 GRAVES DISEASE: ICD-10-CM

## 2025-04-21 RX ORDER — METHIMAZOLE 5 MG/1
7.5 TABLET ORAL DAILY
Qty: 45 TABLET | Refills: 4 | Status: CANCELLED | OUTPATIENT
Start: 2025-04-21

## 2025-04-22 ENCOUNTER — TELEPHONE (OUTPATIENT)
Dept: ENDOCRINOLOGY | Facility: CLINIC | Age: 25
End: 2025-04-22
Payer: COMMERCIAL

## 2025-04-22 NOTE — TELEPHONE ENCOUNTER
Patient confirmed scheduled appointment:  Date: 04/23/25  Time: 4:30  Visit type: Labs  Provider: Sandra Paulson MD  Location: [facility]  Testing/imaging: N/A  Additional notes: Scheduled per pt, pt is already out of methimazole and concerned about possible effects of not taking medication. Will forward request to clinic staff.    Traci Hoffmann, RN to [id number]   DM      4/22/25  7:39 AM  Please help schedule lab draw. Thank you.    Asia Berry on 4/22/2025 at 4:25 PM

## 2025-04-23 NOTE — TELEPHONE ENCOUNTER
Lab draw scheduled APR 22 2025. Does not appear to be completed.     LVM and MYChart message sent to clarify medication dosing, schedule lab draw and arrange medication  from pharmacy.   Traci Hoffmann RN on 4/23/2025 at 8:11 AM

## 2025-05-15 ENCOUNTER — MYC REFILL (OUTPATIENT)
Dept: ENDOCRINOLOGY | Facility: CLINIC | Age: 25
End: 2025-05-15
Payer: COMMERCIAL

## 2025-05-15 DIAGNOSIS — E05.00 GRAVES DISEASE: ICD-10-CM

## 2025-05-15 RX ORDER — METHIMAZOLE 5 MG/1
7.5 TABLET ORAL DAILY
Qty: 45 TABLET | Refills: 4 | Status: CANCELLED | OUTPATIENT
Start: 2025-05-15

## 2025-05-16 ENCOUNTER — LAB (OUTPATIENT)
Dept: LAB | Facility: CLINIC | Age: 25
End: 2025-05-16
Payer: COMMERCIAL

## 2025-05-16 DIAGNOSIS — E05.00 GRAVES DISEASE: ICD-10-CM

## 2025-05-16 LAB
T3 SERPL-MCNC: 231 NG/DL (ref 85–202)
T4 FREE SERPL-MCNC: 2.92 NG/DL (ref 0.9–1.7)
TSH SERPL DL<=0.005 MIU/L-ACNC: 0.01 UIU/ML (ref 0.3–4.2)

## 2025-05-16 PROCEDURE — 84439 ASSAY OF FREE THYROXINE: CPT

## 2025-05-16 PROCEDURE — 84480 ASSAY TRIIODOTHYRONINE (T3): CPT

## 2025-05-16 PROCEDURE — 36415 COLL VENOUS BLD VENIPUNCTURE: CPT

## 2025-05-16 PROCEDURE — 84443 ASSAY THYROID STIM HORMONE: CPT

## 2025-05-17 ENCOUNTER — RESULTS FOLLOW-UP (OUTPATIENT)
Dept: ENDOCRINOLOGY | Facility: CLINIC | Age: 25
End: 2025-05-17

## 2025-07-21 ENCOUNTER — TELEPHONE (OUTPATIENT)
Dept: ENDOCRINOLOGY | Facility: CLINIC | Age: 25
End: 2025-07-21
Payer: COMMERCIAL

## 2025-07-21 NOTE — TELEPHONE ENCOUNTER
Left Voicemail (1st Attempt) and Sent Mychart (1st Attempt) for the patient to call back and schedule the following:    Appointment type: Labs  Provider: Sandra Paulson MD  Return date: next available  Specialty phone number: [phone]  Additional appointment(s) needed: n/A   Additonal Notes: LVM, MyCx1. OP [facility] LAB preferred.      5/18/25  5:39 PM  You have Rx with 4 refills. This mean that before you finish your current supply, you should call the pharmacy to get the refill, so that you are never without the medication unless I tell you to.  I recommend you restart methimazole at 7.5 mg/day and repeat labs in one month.     Asia Berry on 7/21/2025 at 10:38 AM